# Patient Record
(demographics unavailable — no encounter records)

---

## 2024-10-31 NOTE — HISTORY OF PRESENT ILLNESS
[de-identified] : Patient is a 52-year-old female who sustained a fall.  She complains of pain around her tailbone.  She denies any saddle anesthesia or incontinence.

## 2024-10-31 NOTE — PHYSICAL EXAM
[Antalgic] : antalgic [de-identified] : Examination of the lumbar spine reveals no midline tenderness palpation, step-offs, or skin lesions. Decreased range of motion with respect to flexion, extension, lateral bending, and rotation. No tenderness to palpation of the sciatic notch. No tenderness palpation of the bilateral greater trochanters. No pain with passive internal/external rotation of the hips. No instability of bilateral lower extremities.  Negative GONSALO. Negative straight leg raise bilaterally. No bowstring. Negative femoral stretch. 5 out of 5 iliopsoas, hip abductors, hips adductors, quadriceps, hamstrings, gastrocsoleus, tibialis anterior, extensor hallucis longus, peroneals. Grossly intact sensation to light touch bilateral lower extremities. 1+ patellar and Achilles reflexes. Downgoing Babinski. No clonus. Intact proprioception. Palpable pulses. No skin lesion and no edema on the right and left lower extremities. [de-identified] : AP lateral sacrococcygeal x-rays does not demonstrate any obvious fracture.  L5-S1 spondylosis

## 2024-10-31 NOTE — DISCUSSION/SUMMARY
[de-identified] : We discussed further treatment options.  She will try muscle relaxant in addition to an anti-inflammatory.  She also try some Voltaren cream locally.  Activity modifications were advised.  Follow-up in 4 weeks time or sooner with any changes or worsening of her symptoms.